# Patient Record
Sex: FEMALE | Race: BLACK OR AFRICAN AMERICAN | NOT HISPANIC OR LATINO | Employment: FULL TIME | RURAL
[De-identification: names, ages, dates, MRNs, and addresses within clinical notes are randomized per-mention and may not be internally consistent; named-entity substitution may affect disease eponyms.]

---

## 2023-03-28 ENCOUNTER — OFFICE VISIT (OUTPATIENT)
Dept: FAMILY MEDICINE | Facility: CLINIC | Age: 23
End: 2023-03-28
Payer: MEDICAID

## 2023-03-28 VITALS
SYSTOLIC BLOOD PRESSURE: 135 MMHG | TEMPERATURE: 98 F | DIASTOLIC BLOOD PRESSURE: 87 MMHG | WEIGHT: 120 LBS | OXYGEN SATURATION: 98 % | HEART RATE: 95 BPM

## 2023-03-28 DIAGNOSIS — Z20.2 STD EXPOSURE: Primary | ICD-10-CM

## 2023-03-28 LAB
B-HCG UR QL: NEGATIVE
BILIRUB UR QL STRIP: NEGATIVE
CANDIDA SPECIES: POSITIVE
CLARITY UR: CLEAR
COLOR UR: NORMAL
CTP QC/QA: YES
GARDNERELLA: POSITIVE
GLUCOSE UR STRIP-MCNC: NORMAL MG/DL
KETONES UR STRIP-SCNC: NEGATIVE MG/DL
LEUKOCYTE ESTERASE UR QL STRIP: NEGATIVE
NITRITE UR QL STRIP: NEGATIVE
PH UR STRIP: 6.5 PH UNITS
PROT UR QL STRIP: NEGATIVE
RBC # UR STRIP: NEGATIVE /UL
SP GR UR STRIP: 1.01
TRICHOMONAS: NEGATIVE
UROBILINOGEN UR STRIP-ACNC: NORMAL MG/DL

## 2023-03-28 PROCEDURE — 99203 PR OFFICE/OUTPT VISIT, NEW, LEVL III, 30-44 MIN: ICD-10-PCS | Mod: ,,, | Performed by: NURSE PRACTITIONER

## 2023-03-28 PROCEDURE — 81025 URINE PREGNANCY TEST: CPT | Mod: RHCUB | Performed by: NURSE PRACTITIONER

## 2023-03-28 PROCEDURE — 87491 CHLAMYDIA/GONORRHOEAE(GC), PCR: ICD-10-PCS | Mod: ,,, | Performed by: CLINICAL MEDICAL LABORATORY

## 2023-03-28 PROCEDURE — 87480 BACTERIAL VAGINOSIS: ICD-10-PCS | Mod: ,,, | Performed by: CLINICAL MEDICAL LABORATORY

## 2023-03-28 PROCEDURE — 1160F RVW MEDS BY RX/DR IN RCRD: CPT | Mod: CPTII,,, | Performed by: NURSE PRACTITIONER

## 2023-03-28 PROCEDURE — 87660 BACTERIAL VAGINOSIS: ICD-10-PCS | Mod: ,,, | Performed by: CLINICAL MEDICAL LABORATORY

## 2023-03-28 PROCEDURE — 87480 CANDIDA DNA DIR PROBE: CPT | Mod: ,,, | Performed by: CLINICAL MEDICAL LABORATORY

## 2023-03-28 PROCEDURE — 1160F PR REVIEW ALL MEDS BY PRESCRIBER/CLIN PHARMACIST DOCUMENTED: ICD-10-PCS | Mod: CPTII,,, | Performed by: NURSE PRACTITIONER

## 2023-03-28 PROCEDURE — 3075F PR MOST RECENT SYSTOLIC BLOOD PRESS GE 130-139MM HG: ICD-10-PCS | Mod: CPTII,,, | Performed by: NURSE PRACTITIONER

## 2023-03-28 PROCEDURE — 87491 CHLMYD TRACH DNA AMP PROBE: CPT | Mod: ,,, | Performed by: CLINICAL MEDICAL LABORATORY

## 2023-03-28 PROCEDURE — 1159F PR MEDICATION LIST DOCUMENTED IN MEDICAL RECORD: ICD-10-PCS | Mod: CPTII,,, | Performed by: NURSE PRACTITIONER

## 2023-03-28 PROCEDURE — 87510 GARDNER VAG DNA DIR PROBE: CPT | Mod: ,,, | Performed by: CLINICAL MEDICAL LABORATORY

## 2023-03-28 PROCEDURE — 3079F DIAST BP 80-89 MM HG: CPT | Mod: CPTII,,, | Performed by: NURSE PRACTITIONER

## 2023-03-28 PROCEDURE — 81003 URINALYSIS, REFLEX TO URINE CULTURE: ICD-10-PCS | Mod: QW,,, | Performed by: CLINICAL MEDICAL LABORATORY

## 2023-03-28 PROCEDURE — 87591 N.GONORRHOEAE DNA AMP PROB: CPT | Mod: ,,, | Performed by: CLINICAL MEDICAL LABORATORY

## 2023-03-28 PROCEDURE — 87510 BACTERIAL VAGINOSIS: ICD-10-PCS | Mod: ,,, | Performed by: CLINICAL MEDICAL LABORATORY

## 2023-03-28 PROCEDURE — 87660 TRICHOMONAS VAGIN DIR PROBE: CPT | Mod: ,,, | Performed by: CLINICAL MEDICAL LABORATORY

## 2023-03-28 PROCEDURE — 1159F MED LIST DOCD IN RCRD: CPT | Mod: CPTII,,, | Performed by: NURSE PRACTITIONER

## 2023-03-28 PROCEDURE — 87591 CHLAMYDIA/GONORRHOEAE(GC), PCR: ICD-10-PCS | Mod: ,,, | Performed by: CLINICAL MEDICAL LABORATORY

## 2023-03-28 PROCEDURE — 81003 URINALYSIS AUTO W/O SCOPE: CPT | Mod: QW,,, | Performed by: CLINICAL MEDICAL LABORATORY

## 2023-03-28 PROCEDURE — 99203 OFFICE O/P NEW LOW 30 MIN: CPT | Mod: ,,, | Performed by: NURSE PRACTITIONER

## 2023-03-28 PROCEDURE — 3075F SYST BP GE 130 - 139MM HG: CPT | Mod: CPTII,,, | Performed by: NURSE PRACTITIONER

## 2023-03-28 PROCEDURE — 3079F PR MOST RECENT DIASTOLIC BLOOD PRESSURE 80-89 MM HG: ICD-10-PCS | Mod: CPTII,,, | Performed by: NURSE PRACTITIONER

## 2023-03-28 NOTE — PROGRESS NOTES
JESSICA Dahl   [place] [address] [facility] [place]  [address]     PATIENT NAME: Maren Suh  : 2000  DATE: 3/28/23  MRN: [id number]      Billing Provider: JESSICA Dahl  Level of Service: [place] OFFICE/OUTPT VISIT, ERIC FERGUSON III, 30-44 MIN  Patient PCP Information       Provider PCP Type    [facility]            Reason for Visit / Chief Complaint: Exposure to STD       Update PCP  Update Chief Complaint         History of Present Illness / Problem Focused Workflow     Maren Suh presents to the clinic with Exposure to STD     24 y/o female presents for STD check. Was seen a few weeks ago at another clinic after she found out her boyfriend had been cheating on her and she tested positive for chlamydia and trichomonas. She took her medicine, went for a recheck and her trich test was still positive. Given a 7 day course of antibiotics for it and was waiting to hear back from them about when she can go back to see if it is now fully treated, but she did not hear back from them. She decided to come here for a second opinion and wants a full STD panel with blood work. She is asymptomatic.    Exposure to STD       Review of Systems     Review of Systems   Constitutional: Negative.    HENT: Negative.     Eyes: Negative.    Respiratory: Negative.     Cardiovascular: Negative.    Gastrointestinal: Negative.    Endocrine: Negative.    Genitourinary: Negative.    Musculoskeletal: Negative.    Integumentary:  Negative.   Neurological: Negative.    Psychiatric/Behavioral: Negative.     All other systems reviewed and are negative.     Medical / Social / Family History   History reviewed. No pertinent past medical history.    Past Surgical History:   Procedure Laterality Date     SECTION         Social History  Ms. Suh  reports that she has been smoking cigarettes. She uses smokeless tobacco. She reports that she does not drink alcohol and does not use  drugs.    Family History  Ms.'s Suh family history includes Cancer in her mother.    Medications and Allergies     Medications  No outpatient medications have been marked as taking for the 3/28/23 encounter (Office Visit) with JESSICA Dahl.       Allergies  Review of patient's allergies indicates:  Not on File    Physical Examination     Vitals:    03/28/23 1354   BP: 135/87   Pulse: 95   Temp: 97.9 °F (36.6 °C)     Physical Exam  Vitals and nursing note reviewed.   Constitutional:       Appearance: Normal appearance. She is normal weight.   HENT:      Head: Normocephalic and atraumatic.      Mouth/Throat:      Mouth: Mucous membranes are moist.      Pharynx: Oropharynx is clear.   Eyes:      Extraocular Movements: Extraocular movements intact.      Conjunctiva/sclera: Conjunctivae normal.      Pupils: Pupils are equal, round, and reactive to light.   Cardiovascular:      Rate and Rhythm: Normal rate and regular rhythm.      Pulses: Normal pulses.      Heart sounds: Normal heart sounds.   Pulmonary:      Effort: Pulmonary effort is normal.      Breath sounds: Normal breath sounds.   Abdominal:      General: Abdomen is flat. Bowel sounds are normal.      Palpations: Abdomen is soft.   Musculoskeletal:         General: Normal range of motion.      Cervical back: Normal range of motion and neck supple.   Skin:     General: Skin is warm and dry.      Capillary Refill: Capillary refill takes less than 2 seconds.   Neurological:      General: No focal deficit present.      Mental Status: She is alert and oriented to person, place, and time. Mental status is at baseline.   Psychiatric:         Mood and Affect: Mood normal.         Behavior: Behavior normal.         Thought Content: Thought content normal.         Judgment: Judgment normal.            Assessment and Plan (including Health Maintenance)      Problem List  Smart Sets  Document Outside HM   :    Plan:   STD exposure  -     Urinalysis, Reflex to Urine  Culture; Future; Expected date: 03/28/2023  -     Chlamydia/GC, PCR; Future; Expected date: 03/28/2023  -     POCT urine pregnancy  -     Syphilis Antibody with reflex to RPR; Future; Expected date: 03/28/2023  -     Bacterial Vaginosis; Future; Expected date: 03/28/2023  -     HIV 1/2 Ag/Ab (4th Gen); Future; Expected date: 03/28/2023  -     HSV 1 & 2, IgG; Future; Expected date: 03/28/2023  -     HSV 1 & 2, IgM; Future; Expected date: 03/28/2023  -     Hepatitis C Antibody; Future; Expected date: 03/28/2023           Health Maintenance Due   Topic Date Due    Hepatitis C Screening  Never done    Lipid Panel  Never done    COVID-19 Vaccine (1) Never done    Pneumococcal Vaccines (Age 0-64) (1 - PCV) Never done    HPV Vaccines (1 - 2-dose series) Never done    HIV Screening  Never done    Chlamydia Screening  Never done    TETANUS VACCINE  Never done    Pap Smear  Never done    Influenza Vaccine (1) Never done       Problem List Items Addressed This Visit    None  Visit Diagnoses       STD exposure    -  Primary    Relevant Orders    Urinalysis, Reflex to Urine Culture (Completed)    Chlamydia/GC, PCR    POCT urine pregnancy (Completed)    Syphilis Antibody with reflex to RPR    Bacterial Vaginosis (Completed)    HIV 1/2 Ag/Ab (4th Gen)    HSV 1 & 2, IgG    HSV 1 & 2, IgM    Hepatitis C Antibody            The patient has no Health Maintenance topics of status Not Due    No future appointments.     Patient Instructions   We will call you with results and treat you accordingly. Let us know if you have any questions! Return for a wellness appointment if you would like to update your pap smear and draw your cholesterol level.   Follow up if symptoms worsen or fail to improve.     Signature:  JESSICA Dahl      Date of encounter: 3/28/23

## 2023-03-28 NOTE — LETTER
March 28, 2023      [facility]  [address]  [place] [address]  Phone: [phone]  Fax: [phone]       Patient: Maren Suh   YOB: 2000  Date of Visit: 03/28/2023    To Whom It May Concern:    MARSHALL Suh  was at [facility] on 03/28/2023. The patient may return to work/school on 3/29/2023 with no restrictions. If you have any questions or concerns, or if I can be of further assistance, please do not hesitate to contact me.    Sincerely,    JESSICA Dahl

## 2023-03-28 NOTE — PATIENT INSTRUCTIONS
We will call you with results and treat you accordingly. Let us know if you have any questions! Return for a wellness appointment if you would like to update your pap smear and draw your cholesterol level.

## 2023-03-29 DIAGNOSIS — N76.0 BACTERIAL VAGINOSIS: Primary | ICD-10-CM

## 2023-03-29 DIAGNOSIS — B37.9 YEAST INFECTION: ICD-10-CM

## 2023-03-29 DIAGNOSIS — B96.89 BACTERIAL VAGINOSIS: Primary | ICD-10-CM

## 2023-03-29 RX ORDER — FLUCONAZOLE 150 MG/1
150 TABLET ORAL DAILY
Qty: 1 TABLET | Refills: 0 | Status: SHIPPED | OUTPATIENT
Start: 2023-03-29 | End: 2023-03-30

## 2023-03-30 ENCOUNTER — TELEPHONE (OUTPATIENT)
Dept: FAMILY MEDICINE | Facility: CLINIC | Age: 23
End: 2023-03-30
Payer: MEDICAID

## 2023-03-30 LAB
CHLAMYDIA BY PCR: NEGATIVE
N. GONORRHOEAE (GC) BY PCR: NEGATIVE

## 2023-03-30 NOTE — TELEPHONE ENCOUNTER
----- Message from JESISCA Dahl sent at 3/29/2023  9:53 AM CDT -----  Positive for BV and yeast, medication sent to pharmacy for treatment. Trichomonas is negative. Still waiting for her to have blood drawn for other tests.

## 2023-03-30 NOTE — TELEPHONE ENCOUNTER
Patient called and I spoke with her about results and to come back to clinic to have her blood drawn. She will come back today

## 2023-04-05 ENCOUNTER — TELEPHONE (OUTPATIENT)
Dept: FAMILY MEDICINE | Facility: CLINIC | Age: 23
End: 2023-04-05
Payer: MEDICAID

## 2023-04-05 NOTE — TELEPHONE ENCOUNTER
----- Message from JESSICA Dahl sent at 4/3/2023  8:49 AM CDT -----  Syphilis, Hep C, HIV all negative. The only test still pending on blood work is herpes test, this typically takes a few days longer to return.

## 2023-07-11 ENCOUNTER — PATIENT MESSAGE (OUTPATIENT)
Dept: RESEARCH | Facility: HOSPITAL | Age: 23
End: 2023-07-11